# Patient Record
Sex: FEMALE
[De-identification: names, ages, dates, MRNs, and addresses within clinical notes are randomized per-mention and may not be internally consistent; named-entity substitution may affect disease eponyms.]

---

## 2022-04-27 ENCOUNTER — HOSPITAL ENCOUNTER (EMERGENCY)
Dept: HOSPITAL 5 - ED | Age: 47
Discharge: HOME | End: 2022-04-27
Payer: SELF-PAY

## 2022-04-27 VITALS — DIASTOLIC BLOOD PRESSURE: 67 MMHG | SYSTOLIC BLOOD PRESSURE: 122 MMHG

## 2022-04-27 DIAGNOSIS — R51.9: Primary | ICD-10-CM

## 2022-04-27 DIAGNOSIS — Z79.899: ICD-10-CM

## 2022-04-27 DIAGNOSIS — Z88.0: ICD-10-CM

## 2022-04-27 LAB
ALBUMIN SERPL-MCNC: 3.5 G/DL (ref 3.9–5)
ALT SERPL-CCNC: 7 UNITS/L (ref 7–56)
BASOPHILS # (AUTO): 0.1 K/MM3 (ref 0–0.1)
BASOPHILS NFR BLD AUTO: 0.7 % (ref 0–1.8)
BUN SERPL-MCNC: 8 MG/DL (ref 7–17)
BUN/CREAT SERPL: 10 %
CALCIUM SERPL-MCNC: 8.1 MG/DL (ref 8.4–10.2)
EOSINOPHIL # BLD AUTO: 0.2 K/MM3 (ref 0–0.4)
EOSINOPHIL NFR BLD AUTO: 2.4 % (ref 0–4.3)
HCT VFR BLD CALC: 37.7 % (ref 30.3–42.9)
HEMOLYSIS INDEX: 22
HGB BLD-MCNC: 12.4 GM/DL (ref 10.1–14.3)
LYMPHOCYTES # BLD AUTO: 2.1 K/MM3 (ref 1.2–5.4)
LYMPHOCYTES NFR BLD AUTO: 28.2 % (ref 13.4–35)
MCHC RBC AUTO-ENTMCNC: 33 % (ref 30–34)
MCV RBC AUTO: 92 FL (ref 79–97)
MONOCYTES # (AUTO): 0.4 K/MM3 (ref 0–0.8)
MONOCYTES % (AUTO): 5.5 % (ref 0–7.3)
PLATELET # BLD: 316 K/MM3 (ref 140–440)
RBC # BLD AUTO: 4.11 M/MM3 (ref 3.65–5.03)

## 2022-04-27 PROCEDURE — 96375 TX/PRO/DX INJ NEW DRUG ADDON: CPT

## 2022-04-27 PROCEDURE — 85025 COMPLETE CBC W/AUTO DIFF WBC: CPT

## 2022-04-27 PROCEDURE — 81025 URINE PREGNANCY TEST: CPT

## 2022-04-27 PROCEDURE — 70450 CT HEAD/BRAIN W/O DYE: CPT

## 2022-04-27 PROCEDURE — 80053 COMPREHEN METABOLIC PANEL: CPT

## 2022-04-27 PROCEDURE — 80307 DRUG TEST PRSMV CHEM ANLYZR: CPT

## 2022-04-27 PROCEDURE — 85652 RBC SED RATE AUTOMATED: CPT

## 2022-04-27 PROCEDURE — 84484 ASSAY OF TROPONIN QUANT: CPT

## 2022-04-27 PROCEDURE — 99284 EMERGENCY DEPT VISIT MOD MDM: CPT

## 2022-04-27 PROCEDURE — 93005 ELECTROCARDIOGRAM TRACING: CPT

## 2022-04-27 PROCEDURE — 96374 THER/PROPH/DIAG INJ IV PUSH: CPT

## 2022-04-27 PROCEDURE — 36415 COLL VENOUS BLD VENIPUNCTURE: CPT

## 2022-04-27 NOTE — EMERGENCY DEPARTMENT REPORT
ED General Adult HPI





- General


Chief complaint: Headache


Stated complaint: ACUTE HEADACHE


Time Seen by Provider: 04/27/22 14:06


Source: patient, EMS


Mode of arrival: Stretcher


Limitations: No Limitations





- History of Present Illness


Initial comments: 





Patient presents with complaints of headache x 30 minutes, bifrontal and @ 

anterior vertex, non radiating, 7/10, not worsened or relieved by anything, 

associated with blurry vision and mild neck stiffness. Denies numbness, 

weakness, rhinorrhea, nasal congestion.


Severity scale (0 -10): 0





- Related Data


                                Home Medications











 Medication  Instructions  Recorded  Confirmed  Last Taken


 


NexIUM 40 mg PO QDAY 04/27/22 04/27/22 1 Day Ago





    ~04/26/22











                                    Allergies











Allergy/AdvReac Type Severity Reaction Status Date / Time


 


Penicillins Allergy  Unknown Verified 04/27/22 14:32














ED Review of Systems


ROS: 


Stated complaint: ACUTE HEADACHE


Other details as noted in HPI





Comment: All other systems reviewed and negative


Constitutional: denies: chills, fever





ED Past Medical Hx





- Past Medical History


Previous Medical History?: No





- Medications


Home Medications: 


                                Home Medications











 Medication  Instructions  Recorded  Confirmed  Last Taken  Type


 


NexIUM 40 mg PO QDAY 04/27/22 04/27/22 1 Day Ago History





    ~04/26/22 














ED Physical Exam





- General


Limitations: No Limitations


General appearance: alert, in no apparent distress





- Head


Head exam: Present: atraumatic, normocephalic





- Eye


Eye exam: Present: PERRL, EOMI





- ENT


ENT exam: Present: mucous membranes moist, other (airway patent)





- Neck


Neck exam: Present: other (supple; no JVD)





- Respiratory


Respiratory exam: Present: other (good air entry, nml I:E, CTAB, no use of MAIN)





- Cardiovascular


Cardiovascular Exam: Present: regular rate.  Absent: rubs, gallop





- GI/Abdominal


GI/Abdominal exam: Present: soft, normal bowel sounds.  Absent: distended, 

tenderness





- Extremities Exam


Extremities exam: Present: full ROM.  Absent: tenderness





- Back Exam


Back exam: Present: full ROM.  Absent: tenderness





- Neurological Exam


Neurological exam: Present: alert, oriented X3, CN II-XII intact, other (motor 

5/5 globally; sensation intact globally; neg Kernig's and Brudzinski's signs; 

NIH 0)





- Skin


Skin exam: Present: warm, normal color





ED Course


                                   Vital Signs











  04/27/22 04/27/22 04/27/22





  13:58 14:17 14:28


 


Temperature 98.8 F 98.6 F 


 


Pulse Rate 66 68 


 


Respiratory  16 





Rate   


 


Blood Pressure   


 


Blood Pressure 139/78 129/78 





[Left]   


 


O2 Sat by Pulse 100 98 100





Oximetry   














  04/27/22 04/27/22 04/27/22





  14:30 14:33 18:06


 


Temperature   98.3 F


 


Pulse Rate   69


 


Respiratory  16 18





Rate   


 


Blood Pressure 145/80  


 


Blood Pressure   122/67





[Left]   


 


O2 Sat by Pulse 100 99 99





Oximetry   














ED Medical Decision Making





- Lab Data


Result diagrams: 


                                 04/27/22 14:50





                                 04/27/22 14:50








                         Laboratory Results - last 72 hr











  04/27/22 04/27/22 04/27/22





  14:33 14:33 14:50


 


WBC    7.6


 


RBC    4.11


 


Hgb    12.4


 


Hct    37.7


 


MCV    92


 


MCH    30


 


MCHC    33


 


RDW    13.8


 


Plt Count    316


 


Lymph % (Auto)    28.2


 


Mono % (Auto)    5.5


 


Eos % (Auto)    2.4


 


Baso % (Auto)    0.7


 


Lymph # (Auto)    2.1


 


Mono # (Auto)    0.4


 


Eos # (Auto)    0.2


 


Baso # (Auto)    0.1


 


Seg Neutrophils %    63.2


 


Seg Neutrophils #    4.8


 


ESR    8


 


Sodium   


 


Potassium   


 


Chloride   


 


Carbon Dioxide   


 


Anion Gap   


 


BUN   


 


Creatinine   


 


Estimated GFR   


 


BUN/Creatinine Ratio   


 


Glucose   


 


Calcium   


 


Total Bilirubin   


 


AST   


 


ALT   


 


Alkaline Phosphatase   


 


Troponin T   


 


Total Protein   


 


Albumin   


 


Albumin/Globulin Ratio   


 


Urine HCG, Qual   Negative 


 


Urine Opiates Screen  Negative  


 


Urine Methadone Screen  Negative  


 


Ur Barbiturates Screen  Negative  


 


Ur Phencyclidine Scrn  Negative  


 


Ur Amphetamines Screen  Negative  


 


U Benzodiazepines Scrn  Negative  


 


Urine Cocaine Screen  Negative  


 


U Marijuana (THC) Screen  Negative  


 


Drugs of Abuse Note  Disclamer  














  04/27/22 04/27/22





  14:50 14:50


 


WBC  


 


RBC  


 


Hgb  


 


Hct  


 


MCV  


 


MCH  


 


MCHC  


 


RDW  


 


Plt Count  


 


Lymph % (Auto)  


 


Mono % (Auto)  


 


Eos % (Auto)  


 


Baso % (Auto)  


 


Lymph # (Auto)  


 


Mono # (Auto)  


 


Eos # (Auto)  


 


Baso # (Auto)  


 


Seg Neutrophils %  


 


Seg Neutrophils #  


 


ESR  


 


Sodium  141 


 


Potassium  3.5 L 


 


Chloride  110.0 H 


 


Carbon Dioxide  19 L 


 


Anion Gap  16 


 


BUN  8 


 


Creatinine  0.8 


 


Estimated GFR  > 60 


 


BUN/Creatinine Ratio  10 


 


Glucose  80 


 


Calcium  8.1 L 


 


Total Bilirubin  < 0.20 


 


AST  10 


 


ALT  7 


 


Alkaline Phosphatase  63 


 


Troponin T   < 0.010


 


Total Protein  5.2 L 


 


Albumin  3.5 L 


 


Albumin/Globulin Ratio  2.1 


 


Urine HCG, Qual  


 


Urine Opiates Screen  


 


Urine Methadone Screen  


 


Ur Barbiturates Screen  


 


Ur Phencyclidine Scrn  


 


Ur Amphetamines Screen  


 


U Benzodiazepines Scrn  


 


Urine Cocaine Screen  


 


U Marijuana (THC) Screen  


 


Drugs of Abuse Note  














CT head: no acute intracranial process





EKG: HR 54, Sr, n ml intervals, no significant STR changes in contiguous leads





- Medical Decision Making





Likely 2/2 migraine vs tension HA. SAH, ICH, meningitis, intracranial space 

occupying lesion unlikely. 

















Received morphine 2 mg IV x 1, zofran 4 mg IV x 1. HA resolved. Denies neck 

stiffness, numbness, weakness, changes in vision. 


Critical care attestation.: 


If time is entered above; I have spent that time in minutes in the direct care 

of this critically ill patient, excluding procedure time.








ED Disposition


Clinical Impression: 


 Headache





Disposition: 01 HOME / SELF CARE / HOMELESS


Is pt being admited?: No


Does the pt Need Aspirin: No


Condition: Stable


Instructions:  General Headache Without Cause


Additional Instructions: 


Return to the ER if your symptoms worsen


Referrals: 


LORRAINE ALVAREZ MD [Primary Care Provider] - 3-5 Days


Time of Disposition: 17:00 (Suggested admission for obs for patient but patient 

insisted on being discharged home. Patient has capacity and indicated 

understanding of instructions. )

## 2022-04-27 NOTE — CAT SCAN REPORT
CT BRAIN: 4/27/2022



INDICATION / CLINICAL INFORMATION:

Headache; blurry vision.



COMPARISON: 

None available.



FINDINGS:



BRAIN/INTRACRANIAL STRUCTURES: Unenhanced CT images of the brain demonstrate no evidence of acute abn
ormality.



Ventricles and sulci are normal in size and shape.



There is no evidence of hemorrhage or mass. There are no abnormal extra-axial fluid collections.









EXTRACRANIAL STRUCTURES: Unremarkable.







IMPRESSION:

 No acute abnormality









All CT scans at this location are performed using dose reduction to ALARA by means of automated expos
ure control.



Signer Name: Xiang Toney MD 

Signed: 4/27/2022 3:25 PM

Workstation Name: [id number]

## 2022-04-29 NOTE — ELECTROCARDIOGRAPH REPORT
[facility]

                                       

Test Date:    2022               Test Time:    15:18:26

Pat Name:     PEPE GUIDRY              Department:   

Patient ID:   [id number]          Room:          

Gender:       F                        Technician:   NOLBERTO

:          1975               Requested By: ASHLYN MOSER

Order Number: [id number]              Reading MD:   Sandra Perez

                                 Measurements

Intervals                              Axis          

Rate:         55                       P:            75

CA:           132                      QRS:          68

QRSD:         82                       T:            49

QT:           439                                    

QTc:          419                                    

                           Interpretive Statements

Sinus rhythm

T wave inversion, consider anterior ischemia

No previous ECG available for comparison

Electronically Signed On 2022 16:55:35 EDT by Sandra Perez

## 2023-03-08 ENCOUNTER — OFFICE VISIT (OUTPATIENT)
Dept: URBAN - METROPOLITAN AREA CLINIC 118 | Facility: CLINIC | Age: 48
End: 2023-03-08